# Patient Record
Sex: MALE | URBAN - METROPOLITAN AREA
[De-identification: names, ages, dates, MRNs, and addresses within clinical notes are randomized per-mention and may not be internally consistent; named-entity substitution may affect disease eponyms.]

---

## 2020-12-02 ENCOUNTER — OFFICE VISIT (OUTPATIENT)
Dept: URBAN - METROPOLITAN AREA CLINIC 41 | Facility: CLINIC | Age: 40
End: 2020-12-02
Payer: COMMERCIAL

## 2020-12-02 DIAGNOSIS — H33.313 HORSESHOE TEAR OF RETINA WITHOUT DETACHMENT, BILATERAL: Primary | ICD-10-CM

## 2020-12-02 DIAGNOSIS — H35.413 LATTICE DEGENERATION OF RETINA, BILATERAL: ICD-10-CM

## 2020-12-02 PROCEDURE — 92014 COMPRE OPH EXAM EST PT 1/>: CPT | Performed by: OPHTHALMOLOGY

## 2020-12-02 PROCEDURE — 92134 CPTRZ OPH DX IMG PST SGM RTA: CPT | Performed by: OPHTHALMOLOGY

## 2020-12-02 PROCEDURE — 92201 OPSCPY EXTND RTA DRAW UNI/BI: CPT | Performed by: OPHTHALMOLOGY

## 2020-12-02 PROCEDURE — 99024 POSTOP FOLLOW-UP VISIT: CPT | Performed by: OPHTHALMOLOGY

## 2020-12-02 ASSESSMENT — INTRAOCULAR PRESSURE
OS: 10
OD: 13

## 2020-12-02 NOTE — IMPRESSION/PLAN
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 Bilateral. Status: Asymptomatic.  Plan: see plan for lattice

## 2020-12-02 NOTE — IMPRESSION/PLAN
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 Plan: Exam and OCT reveal good laser treatment surrounding lattice in the left eye. Lattice remains stable in the right eye. Recommend observation today. No new holes or breaks. No ERM. RDW. Patient to call with any vision changes.  

RTC: 12 months re-eval OCT OU